# Patient Record
Sex: FEMALE | URBAN - METROPOLITAN AREA
[De-identification: names, ages, dates, MRNs, and addresses within clinical notes are randomized per-mention and may not be internally consistent; named-entity substitution may affect disease eponyms.]

---

## 2021-04-16 ENCOUNTER — APPOINTMENT (OUTPATIENT)
Dept: URBAN - METROPOLITAN AREA CLINIC 282 | Age: 31
Setting detail: DERMATOLOGY
End: 2021-04-20

## 2021-04-16 DIAGNOSIS — L72.0 EPIDERMAL CYST: ICD-10-CM

## 2021-04-16 DIAGNOSIS — D22 MELANOCYTIC NEVI: ICD-10-CM

## 2021-04-16 PROBLEM — D48.5 NEOPLASM OF UNCERTAIN BEHAVIOR OF SKIN: Status: ACTIVE | Noted: 2021-04-16

## 2021-04-16 PROCEDURE — OTHER DEFER: OTHER

## 2021-04-16 PROCEDURE — OTHER BIOPSY BY SHAVE METHOD: OTHER

## 2021-04-16 PROCEDURE — OTHER OTHER: OTHER

## 2021-04-16 PROCEDURE — OTHER COUNSELING: OTHER

## 2021-04-16 PROCEDURE — OTHER MIPS QUALITY: OTHER

## 2021-04-16 PROCEDURE — 11102 TANGNTL BX SKIN SINGLE LES: CPT

## 2021-04-16 PROCEDURE — 99202 OFFICE O/P NEW SF 15 MIN: CPT | Mod: 25

## 2021-04-16 ASSESSMENT — LOCATION ZONE DERM
LOCATION ZONE: FACE
LOCATION ZONE: FACE
LOCATION ZONE: TRUNK

## 2021-04-16 ASSESSMENT — LOCATION SIMPLE DESCRIPTION DERM
LOCATION SIMPLE: RIGHT EYEBROW
LOCATION SIMPLE: UPPER BACK
LOCATION SIMPLE: RIGHT EYEBROW

## 2021-04-16 ASSESSMENT — LOCATION DETAILED DESCRIPTION DERM
LOCATION DETAILED: RIGHT CENTRAL EYEBROW
LOCATION DETAILED: INFERIOR THORACIC SPINE
LOCATION DETAILED: RIGHT CENTRAL EYEBROW

## 2021-04-16 NOTE — PROCEDURE: OTHER
Detail Level: Zone
Other (Free Text): Lesion is ~ 0.5 cm.
Note Text (......Xxx Chief Complaint.): This diagnosis correlates with the
Render Risk Assessment In Note?: no

## 2021-04-16 NOTE — PROCEDURE: DEFER
Detail Level: Detailed
Reason To Defer Override: refer to Dr Medrano
Introduction Text (Please End With A Colon): The following procedure was deferred:

## 2021-04-16 NOTE — PROCEDURE: BIOPSY BY SHAVE METHOD
Detail Level: Detailed
Wound Care: Petrolatum
Validate That Anesthesia Is Not 0: No
Electrodesiccation Text: The wound bed was treated with electrodesiccation after the biopsy was performed.
Cryotherapy Text: The wound bed was treated with cryotherapy after the biopsy was performed.
Hemostasis: Drysol
Size Of Lesion In Cm: 0
Post-Care Instructions: I reviewed with the patient in detail post-care instructions. Patient is to keep the biopsy site dry overnight, and then apply bacitracin twice daily until healed. Patient may apply hydrogen peroxide soaks to remove any crusting.
Notification Instructions: Patient will be notified of biopsy results. However, patient instructed to call the office if not contacted within 2 weeks.
Curettage Text: The wound bed was treated with curettage after the biopsy was performed.
Anesthesia Type: 1% lidocaine with epinephrine
Biopsy Method: Dermablade
Dressing: bandage
Anesthesia Volume In Cc (Will Not Render If 0): 0.5
Silver Nitrate Text: The wound bed was treated with silver nitrate after the biopsy was performed.
Consent: Written consent was obtained and risks were reviewed including but not limited to scarring, infection, bleeding, scabbing, incomplete removal, nerve damage and allergy to anesthesia.
Type Of Destruction Used: Curettage
Electrodesiccation And Curettage Text: The wound bed was treated with electrodesiccation and curettage after the biopsy was performed.
Depth Of Biopsy: dermis
Information: Selecting Yes will display possible errors in your note based on the variables you have selected. This validation is only offered as a suggestion for you. PLEASE NOTE THAT THE VALIDATION TEXT WILL BE REMOVED WHEN YOU FINALIZE YOUR NOTE. IF YOU WANT TO FAX A PRELIMINARY NOTE YOU WILL NEED TO TOGGLE THIS TO 'NO' IF YOU DO NOT WANT IT IN YOUR FAXED NOTE.
Billing Type: Third-Party Bill
Biopsy Type: H and E
Was A Bandage Applied: Yes

## 2021-04-27 ENCOUNTER — APPOINTMENT (OUTPATIENT)
Dept: URBAN - METROPOLITAN AREA CLINIC 282 | Age: 31
Setting detail: DERMATOLOGY
End: 2021-04-30

## 2021-04-27 PROBLEM — D23.111 OTHER BENIGN NEOPLASM OF SKIN OF RIGHT UPPER EYELID, INCLUDING CANTHUS: Status: ACTIVE | Noted: 2021-04-27

## 2021-04-27 PROCEDURE — OTHER INCISION AND DRAINAGE: OTHER

## 2021-04-27 PROCEDURE — 10060 I&D ABSCESS SIMPLE/SINGLE: CPT

## 2021-04-27 PROCEDURE — OTHER COUNSELING: OTHER

## 2021-04-27 NOTE — PROCEDURE: INCISION AND DRAINAGE
Hemostasis: Aluminum Chloride
Dressing: no dressing
Drainage Type?: cyst-like
Detail Level: Detailed
Method: 11 blade
Include Sutures?: No
Epidermal Sutures: 4-0 Ethilon
Preparation Text: The area was prepped in the usual clean fashion.
Lesion Type: Cyst
Anesthesia Type: 1% Xylocaine with [id number] epinephrine and sodium bicarbonate
Size Of Lesion In Cm (Optional But May Be Required For Some Insurances): 0.5
Post-Care Instructions: I reviewed with the patient in detail post-care instructions. Patient should keep wound covered and call the office should any redness, pain, swelling or worsening occur.
Curette Text (Optional): After the contents were expressed a curette was used to partially remove the cyst wall.
Drainage Amount?: significant
Anesthesia Volume In Cc: 1.5
Epidermal Closure: simple interrupted
Consent was obtained and risks were reviewed including but not limited to delayed wound healing, infection, need for multiple I and D's, and pain.
Suture Text: The incision was partially closed with
Wound Care: Petrolatum